# Patient Record
Sex: FEMALE | Race: BLACK OR AFRICAN AMERICAN | NOT HISPANIC OR LATINO
[De-identification: names, ages, dates, MRNs, and addresses within clinical notes are randomized per-mention and may not be internally consistent; named-entity substitution may affect disease eponyms.]

---

## 2019-09-12 PROBLEM — Z00.00 ENCOUNTER FOR PREVENTIVE HEALTH EXAMINATION: Status: ACTIVE | Noted: 2019-09-12

## 2019-11-25 ENCOUNTER — APPOINTMENT (OUTPATIENT)
Dept: FAMILY MEDICINE | Facility: CLINIC | Age: 44
End: 2019-11-25

## 2020-09-19 ENCOUNTER — TRANSCRIPTION ENCOUNTER (OUTPATIENT)
Age: 45
End: 2020-09-19

## 2021-09-20 ENCOUNTER — NON-APPOINTMENT (OUTPATIENT)
Age: 46
End: 2021-09-20

## 2021-09-20 ENCOUNTER — APPOINTMENT (OUTPATIENT)
Dept: CARDIOLOGY | Facility: CLINIC | Age: 46
End: 2021-09-20
Payer: MEDICAID

## 2021-09-20 ENCOUNTER — TRANSCRIPTION ENCOUNTER (OUTPATIENT)
Age: 46
End: 2021-09-20

## 2021-09-20 VITALS
DIASTOLIC BLOOD PRESSURE: 78 MMHG | SYSTOLIC BLOOD PRESSURE: 110 MMHG | HEART RATE: 82 BPM | HEIGHT: 67 IN | WEIGHT: 145 LBS | TEMPERATURE: 97.9 F | BODY MASS INDEX: 22.76 KG/M2 | OXYGEN SATURATION: 99 %

## 2021-09-20 DIAGNOSIS — Z86.018 PERSONAL HISTORY OF OTHER BENIGN NEOPLASM: ICD-10-CM

## 2021-09-20 DIAGNOSIS — Z83.3 FAMILY HISTORY OF DIABETES MELLITUS: ICD-10-CM

## 2021-09-20 DIAGNOSIS — Z82.3 FAMILY HISTORY OF STROKE: ICD-10-CM

## 2021-09-20 DIAGNOSIS — E78.5 HYPERLIPIDEMIA, UNSPECIFIED: ICD-10-CM

## 2021-09-20 DIAGNOSIS — Z56.0 UNEMPLOYMENT, UNSPECIFIED: ICD-10-CM

## 2021-09-20 DIAGNOSIS — E11.9 TYPE 2 DIABETES MELLITUS W/OUT COMPLICATIONS: ICD-10-CM

## 2021-09-20 DIAGNOSIS — Z71.89 OTHER SPECIFIED COUNSELING: ICD-10-CM

## 2021-09-20 PROCEDURE — 99204 OFFICE O/P NEW MOD 45 MIN: CPT

## 2021-09-20 PROCEDURE — 99072 ADDL SUPL MATRL&STAF TM PHE: CPT

## 2021-09-20 PROCEDURE — 93000 ELECTROCARDIOGRAM COMPLETE: CPT

## 2021-09-20 SDOH — ECONOMIC STABILITY - INCOME SECURITY: UNEMPLOYMENT, UNSPECIFIED: Z56.0

## 2021-09-20 NOTE — DISCUSSION/SUMMARY
[FreeTextEntry1] : 46F history of HLD and DM2 (now diet control), with episode of sudden syncope in 5/2021 with head laceration, admitted to [place], presents for cardiology evaluation. \par \par Prior episode of syncope unclear if cardiogenic vs. neurogenic etiology, EKG today within normal limits; need records from [place] for review, will obtain baseline echocardiogram,, carotid Duplex and check 2 weeks Event monitor. Referral to neurology as well and unclear mention of multiple sclerosis. \par \par \par \par 1. Syncope- await TTE, carotid Duplex and Event monitor. Need neurology eval. as well. \par \par 2. HLD- on low dose atorvastatin. \par \par 3. DM2- reportedly recent A1 <6.5% on diet control. \par \par \par \par Strongly advised COVID vaccination.

## 2021-09-20 NOTE — HISTORY OF PRESENT ILLNESS
[FreeTextEntry1] : 46F history of HLD and DM2 (now diet control), with episode of sudden syncope in 5/2021 with head laceration, admitted to [facility], presents for cardiology evaluation. \par \par She was admitted for 2-3 days at [facility], had EEG done, given diagnosis of brain stem lesion/cerebellar atrophy, possible multiple sclerosis? She recollects was walking around 8am that morning felt dizziness/lightheaded, unwitnessed event, found by pedestrian who summoned EMS, did not lose any bowel control, no prior or recent syncope. \par \par \par Mother with stroke \par Nonsmoker, no alcohol, no illicit drug use\par Not yet been vaccinated for COVID due to concern of side effect

## 2021-10-01 ENCOUNTER — APPOINTMENT (OUTPATIENT)
Dept: CARDIOLOGY | Facility: CLINIC | Age: 46
End: 2021-10-01

## 2021-12-01 ENCOUNTER — APPOINTMENT (OUTPATIENT)
Dept: NEUROLOGY | Facility: CLINIC | Age: 46
End: 2021-12-01
Payer: MEDICAID

## 2021-12-01 VITALS
DIASTOLIC BLOOD PRESSURE: 80 MMHG | WEIGHT: 143 LBS | BODY MASS INDEX: 22.44 KG/M2 | HEIGHT: 67 IN | SYSTOLIC BLOOD PRESSURE: 120 MMHG

## 2021-12-01 DIAGNOSIS — R55 SYNCOPE AND COLLAPSE: ICD-10-CM

## 2021-12-01 PROCEDURE — 99205 OFFICE O/P NEW HI 60 MIN: CPT

## 2021-12-01 PROCEDURE — 99072 ADDL SUPL MATRL&STAF TM PHE: CPT

## 2021-12-29 ENCOUNTER — APPOINTMENT (OUTPATIENT)
Dept: ANTEPARTUM | Facility: CLINIC | Age: 46
End: 2021-12-29
Payer: MEDICAID

## 2021-12-29 ENCOUNTER — ASOB RESULT (OUTPATIENT)
Age: 46
End: 2021-12-29

## 2021-12-29 PROCEDURE — 99072 ADDL SUPL MATRL&STAF TM PHE: CPT

## 2021-12-29 PROCEDURE — 76856 US EXAM PELVIC COMPLETE: CPT | Mod: 59

## 2021-12-29 PROCEDURE — 76830 TRANSVAGINAL US NON-OB: CPT

## 2022-03-07 ENCOUNTER — NON-APPOINTMENT (OUTPATIENT)
Age: 47
End: 2022-03-07

## 2022-03-15 ENCOUNTER — APPOINTMENT (OUTPATIENT)
Dept: NEUROSURGERY | Facility: CLINIC | Age: 47
End: 2022-03-15
Payer: MEDICAID

## 2022-03-15 VITALS
SYSTOLIC BLOOD PRESSURE: 159 MMHG | HEART RATE: 96 BPM | DIASTOLIC BLOOD PRESSURE: 74 MMHG | HEIGHT: 63 IN | BODY MASS INDEX: 25.34 KG/M2 | TEMPERATURE: 98.1 F | OXYGEN SATURATION: 98 % | WEIGHT: 143 LBS

## 2022-03-15 PROCEDURE — 99072 ADDL SUPL MATRL&STAF TM PHE: CPT

## 2022-03-15 PROCEDURE — 99243 OFF/OP CNSLTJ NEW/EST LOW 30: CPT

## 2022-03-15 NOTE — HISTORY OF PRESENT ILLNESS
[> 3 months] : more  than 3 months [FreeTextEntry1] : MRI results [de-identified] : JUAN VAZQUEZ is a 46 year old female with PMH HLD, and diet controlled diabetes, who presents for neurosurgical evaluation of MRI results that show an infiltrating type lesion involving the posterior fossa structures, including the medulla,ira, middle cerebellar peduncle on the right. She was admitted to the [facility] following a syncopal episode May of 2021.  Imaging then showed that she had a nonenhancing brain stem lesion. She has had no followup since discharge from the hospital. She states that she has been having some dizziness, imbalance, frontal ha with pressure, double vision at times that she describes as 3D vision. She is also having trouble swallowing liquids, solid food, as well as her saliva. She states she feels like she chokes and has to cough to clear her throat. All of these issues have been progressively getting worse over the years. She denies any ulcers in her mouth or mouth pain. She states she had an MRI cervical, thoracic, and lumbar done prior. She also states that she has left hip pain that radiates into left knee. She has been referred to pain management. \par

## 2022-03-15 NOTE — ASSESSMENT
[FreeTextEntry1] : Patient with above history and scanned imaging. Concerning symptoms that relate to lesion on brain stem. She needs further extensive workup. Ordered a MRI spectroscopy- to assess if this lesion is a tumor.\par Recommend: Vasculitis workup, Demyelinating workup, Visual exam, Lumbar puncture with cytology. Rheumatology evaluation and a MRI cervical, thoracic, lumbar if it has not been done. \par Agree with the recommendation of PT and pain management for LBP. \par \par Plan;\par MR spectroscopy to r/o tumor\par f/u after imaging\par f/u with Neurology for further workup. \par r/o vasculitis, demyelinating process, or tumor. \par \par \par \par \par \par \par  \par

## 2022-03-23 ENCOUNTER — RESULT REVIEW (OUTPATIENT)
Age: 47
End: 2022-03-23

## 2022-03-23 ENCOUNTER — APPOINTMENT (OUTPATIENT)
Dept: MRI IMAGING | Facility: CLINIC | Age: 47
End: 2022-03-23
Payer: MEDICAID

## 2022-03-23 PROCEDURE — A9585: CPT

## 2022-03-23 PROCEDURE — 70553 MRI BRAIN STEM W/O & W/DYE: CPT

## 2022-03-23 PROCEDURE — 76390 MR SPECTROSCOPY: CPT

## 2022-03-30 ENCOUNTER — APPOINTMENT (OUTPATIENT)
Dept: NEUROSURGERY | Facility: CLINIC | Age: 47
End: 2022-03-30

## 2022-04-04 ENCOUNTER — APPOINTMENT (OUTPATIENT)
Dept: MRI IMAGING | Facility: CLINIC | Age: 47
End: 2022-04-04

## 2022-04-11 ENCOUNTER — APPOINTMENT (OUTPATIENT)
Dept: MRI IMAGING | Facility: CLINIC | Age: 47
End: 2022-04-11

## 2022-04-26 ENCOUNTER — NON-APPOINTMENT (OUTPATIENT)
Age: 47
End: 2022-04-26

## 2022-04-26 ENCOUNTER — APPOINTMENT (OUTPATIENT)
Dept: NEUROSURGERY | Facility: CLINIC | Age: 47
End: 2022-04-26
Payer: MEDICAID

## 2022-04-26 VITALS
OXYGEN SATURATION: 98 % | SYSTOLIC BLOOD PRESSURE: 159 MMHG | HEART RATE: 91 BPM | WEIGHT: 143 LBS | HEIGHT: 63 IN | DIASTOLIC BLOOD PRESSURE: 90 MMHG | TEMPERATURE: 98 F | BODY MASS INDEX: 25.34 KG/M2

## 2022-04-26 PROCEDURE — 99212 OFFICE O/P EST SF 10 MIN: CPT

## 2022-05-06 ENCOUNTER — OUTPATIENT (OUTPATIENT)
Dept: OUTPATIENT SERVICES | Facility: HOSPITAL | Age: 47
LOS: 1 days | Discharge: HOME | End: 2022-05-06
Payer: COMMERCIAL

## 2022-05-06 ENCOUNTER — NON-APPOINTMENT (OUTPATIENT)
Age: 47
End: 2022-05-06

## 2022-05-06 ENCOUNTER — APPOINTMENT (OUTPATIENT)
Dept: RADIATION ONCOLOGY | Facility: CLINIC | Age: 47
End: 2022-05-06
Payer: MEDICAID

## 2022-05-06 VITALS
DIASTOLIC BLOOD PRESSURE: 91 MMHG | HEART RATE: 93 BPM | WEIGHT: 144 LBS | SYSTOLIC BLOOD PRESSURE: 141 MMHG | BODY MASS INDEX: 25.51 KG/M2 | OXYGEN SATURATION: 98 % | RESPIRATION RATE: 16 BRPM

## 2022-05-06 PROCEDURE — 99205 OFFICE O/P NEW HI 60 MIN: CPT | Mod: 25

## 2022-05-06 RX ORDER — ATORVASTATIN CALCIUM 10 MG/1
10 TABLET, FILM COATED ORAL
Refills: 0 | Status: COMPLETED | COMMUNITY
Start: 2021-09-20 | End: 2022-05-06

## 2022-05-06 NOTE — VITALS
[Maximal Pain Intensity: 9/10] : 9/10 [Least Pain Intensity: 7/10] : 7/10 [Pain Description/Quality: ___] : Pain description/quality: [unfilled] [Pain Duration: ___] : Pain duration: [unfilled] [Pain Location: ___] : Pain Location: [unfilled] [Pain Interferes with ADLs] : Pain interferes with activities of daily living. [NoTreatment Scheduled] : no treatment scheduled [70: Cares for self; unalbe to carry on normal activity or do active work.] : 70: Cares for self; unable to carry on normal activity or do active work. [ECOG Performance Status: 1 - Restricted in physically strenuous activity but ambulatory and able to carry out work of a light or sedentary nature] : Performance Status: 1 - Restricted in physically strenuous activity but ambulatory and able to carry out work of a light or sedentary nature, e.g., light house work, office work [Date: ____________] : Patient's last distress assessment performed on [unfilled]. [7 - Distress Level] : Distress Level: 7 [Referred Patient  to social work for follow-up] : Patient was referred to social work for follow-up

## 2022-05-09 ENCOUNTER — NON-APPOINTMENT (OUTPATIENT)
Age: 47
End: 2022-05-09

## 2022-05-09 NOTE — DISEASE MANAGEMENT
[Clinical] : TNM Stage: c [N/A] : Currently not applicable [FreeTextEntry4] : brainstem neoplasm [TTNM] : - [NTNM] : - [MTNM] : -

## 2022-05-09 NOTE — HISTORY OF PRESENT ILLNESS
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  Reportedly, she underwent imaging at [facility] at that time.\par \par More recently, she had a syncopal episode in 5/2021, and was admitted to [facility].\par \par 5/21/21 MRI brain showed a brainstem lesion involving the ira and medulla, hyperintense on T2 and flair, with no enhancement.  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  \par Other studies from [facility] include:\par 5/21/21 MR arteriogram: no intracranial vascular occlusion, aneurysm, or vascular malformation\par 5/21/21 MRA neck: no stenosis or evidence for dissection.\par 5/21/21 MR Venogram head: no venous sinus thrombosis.\par 5/22/21 MRI of the cervical spine: negative except for the noted brainstem abnormality \par 5/22/21 MRI T-spine: normal\par 5/22/21 MRI L-spine: normal; no disk protrusion or spinal stenosis.\par \par She met with Dr. Keegan Watson on 12/1/21 for back pain, reporting 10+ year history of low back pain radiating down the left leg, worsening recently.\par \par 3/4/22 MRI brain ([facility]): infiltrating type lesion involving the posterior fossa structures including the medulla, ira, middle cerebellar peduncle on the right and cerebral peduncles.  This was felt to be nonspecific, with DDx including infiltrating glioma, anti-GQ1b antibody syndrome, Bechets vasculitis, and rhombencephalitis.\par \par 3/7/22 met with Dr. Carreon ENT for dysphagia: vocal cord paralysis; CT neck recommended.\par \par She met with Dr. Nielson 3/15/22.\par \par 3/23/22 MR Spectroscopy:  Infiltrating mass in the right brainstem including the ira, midbrain, medulla and brachium pontis with elevated choline and decreased KATHRINE suggesting a malignant neoplasm.\par \par Currently reports headaches, nausea x 1 episode, intermittent diplopia, blurry vision, ataxia, dizziness, and persistent left hip pain radiating down the left leg.  Reports symptoms of dysphagia.  Also notes that she has difficulty with pill-form medications.\par

## 2022-05-09 NOTE — OB/GYN HISTORY
[Definite:  ___ (Date)] : the last menstrual period was [unfilled] [___] : Living: [unfilled] [History of Birth Control Pills] : Patient has no history of taking birth control pills [Currently In Menopause] : not currently in menopause

## 2022-05-09 NOTE — REVIEW OF SYSTEMS
[Visual Disturbances] : visual disturbances [Dysphagia] : dysphagia [Negative] : Allergic/Immunologic [Nausea: Grade 1 - Loss of appetite without alteration in eating habits] : Nausea: Grade 1 - Loss of appetite without alteration in eating habits [Vomiting: Grade 0] : Vomiting: Grade 0 [Edema Limbs: Grade 0] : Edema Limbs: Grade 0  [Fatigue: Grade 0] : Fatigue: Grade 0 [Localized Edema: Grade 0] : Localized Edema: Grade 0  [Neck Edema: Grade 0] : Neck Edema: Grade 0 [Urinary Incontinence: Grade 0] : Urinary Incontinence: Grade 0  [Blurred Vision: Grade 1 - Intervention not indicated] : Blurred Vision: Grade 1 - Intervention not indicated [Dizziness: Grade 1 - Mild unsteadiness or sensation of movement] : Dizziness: Grade 1 - Mild unsteadiness or sensation of movement [Headache: Grade 1 - Mild pain] : Headache: Grade 1 - Mild pain [Voice Alteration: Grade 1 - Mild or intermittent change from normal voice] : Voice Alteration: Grade 1 - Mild or intermittent change from normal voice [FreeTextEntry9] : vocal cord paralysis [Confused] : no confusion [Dizziness] : dizziness [Fainting] : fainting [Difficulty Walking] : difficulty walking

## 2022-05-09 NOTE — PHYSICAL EXAM
[Normal] : oriented to person, place and time, the affect was normal, the mood was normal and not anxious [Sclera] : the sclera and conjunctiva were normal [Extraocular Movements] : extraocular movements were intact [Outer Ear] : the ears and nose were normal in appearance [Normal Oral Cavity] : oral cavity was normal [Bowel Sounds] : normal bowel sounds [Abdomen Soft] : soft [Nondistended] : nondistended [Abdomen Tenderness] : non-tender [de-identified] : EOMI, ataxic gait, UE/LE motor strength 5/5

## 2022-05-09 NOTE — LETTER CLOSING
[Consult Closing:] : Thank you for allowing me to participate in the care of this patient.  If you have any questions, please do not hesitate to contact me. [Sincerely yours,] : Sincerely yours, [FreeTextEntry3] : Juanjo Matamoros MD\par  of Radiation Medicine, Quality and Safety\par  of Radiation Medicine\par [employer] [facility] [employer] [facility]\par [facility]\par [facility]\par

## 2022-05-10 ENCOUNTER — NON-APPOINTMENT (OUTPATIENT)
Age: 47
End: 2022-05-10

## 2022-05-12 ENCOUNTER — APPOINTMENT (OUTPATIENT)
Dept: NEUROLOGY | Facility: CLINIC | Age: 47
End: 2022-05-12
Payer: MEDICAID

## 2022-05-12 ENCOUNTER — NON-APPOINTMENT (OUTPATIENT)
Age: 47
End: 2022-05-12

## 2022-05-12 DIAGNOSIS — J38.00 PARALYSIS OF VOCAL CORDS AND LARYNX, UNSPECIFIED: ICD-10-CM

## 2022-05-12 DIAGNOSIS — G93.9 DISORDER OF BRAIN, UNSPECIFIED: ICD-10-CM

## 2022-05-12 PROCEDURE — 99215 OFFICE O/P EST HI 40 MIN: CPT

## 2022-05-12 RX ORDER — PANTOPRAZOLE 20 MG/1
20 TABLET, DELAYED RELEASE ORAL
Qty: 30 | Refills: 1 | Status: ACTIVE | COMMUNITY
Start: 2022-05-12 | End: 1900-01-01

## 2022-05-12 NOTE — REASON FOR VISIT
[Consultation] : a consultation visit [Initial Eval - Existing Diagnosis] : an initial evaluation of an existing diagnosis [Spouse] : spouse

## 2022-05-12 NOTE — PHYSICAL EXAM
[FreeTextEntry1] : \par 5 ft, 154 lbs (different than what is reported in past vitals - 5'7??)\par \par Patient is awake and alert - voice is raspy but fluent with normal reception.\par Pupils reactive to light bilaterally and symmetrically.\par Nystagmus on left lateral and right lateral gaze\par Face symmetric and facial sensation intact.\par No drift\par Strength is full throughout\par Danni ok\par Reflexes 1+ bilateral biceps, triceps, and brachioradialis, absent patella.\par Ambulates with a moderate base - mildly unsteady.

## 2022-05-12 NOTE — DISCUSSION/SUMMARY
[FreeTextEntry1] : \par In summary, this is a 48 yo woman with a brainstem infiltrative process and increasing symptomatology.\par MRI total spine with and without contrast, in light of LBP and urinary symptoms. IMay consider LP.\par While pediatric disease is often treated empirically with RT only, adults often benefit with the addition of Temozolomide. Biopsy would check fpr H3K27 mutation which would give indication whether [id number] would be helpful.\par Decadron 4 mg daily and Pantoprazole.\par Patient to call me on Monday to discuss how she is feeling on the medication.\par

## 2022-05-12 NOTE — HISTORY OF PRESENT ILLNESS
[FreeTextEntry1] : Mrs. Tovar is referred for evaluation of abnormal MRI showing brainstem abnormality.\par \par Patient is a 46 yo right handed woman who had been admitted to [facility] in May, 2021 after a syncopal episode. She recalls that she was walking and felt a pulling sensation in her head. She fell - unclear to her if she lost consciousness. However, while she was there she had multiple imaging studies\par \par 5/21/2021 MRI Brain increased flair signal in the medulla and ira without enhancement. \par MRV unremarkable.\par MRI total spine 5/22/2021 with contrast unrevealing.\par \par For unclear reason, she did not seek medical attention regarding brain MRI until 21/1 when he saw Dr. Watson, a neurologist. Her main complaint was persistent long standing left lower extremity discomfort - present for 10 years and not-responsive to epidural injections or PT.\par \par She admits to the sensation of head pulling getting worse although without diurnal or positional variation, She has nausea or vomiting.\par She has occasional double vision. She reports that she will cough with both liquids and solids and has noted a change in the quality of her voice. She denies and y bowel changes but admits that she occasionally feels that she has not emptied her bladder.

## 2022-05-12 NOTE — HISTORY OF PRESENT ILLNESS
[FreeTextEntry1] : Mrs. Tovar is referred for evaluation of abnormal MRI showing brainstem abnormality.\par \par Patient is a 46 yo right handed woman who had been admitted to [facility] in May, 2021 after a syncopal episode. She recalls that she was walking and felt a pulling sensation in her head. She fell - unclear to her if she lost consciousness. However, while she was there she had multiple imaging studies\par \par 5/21/2021 MRI Brain increased flair signal in the medulla and ira without enhancement. \par MRV unremarkable.\par MRI total spine 5/22/2021 with contrast unrevealing.\par \par For unclear reason, she did not seek medical attention regarding brain MRI until 21/1 when he saw Dr. Watson, a neurologist. Her main complaint was persistent long standing left lower extremity discomfort - present for 10 years and not-responsive to epidural injections or PT.\par \par She admits to the sensation of head pulling getting worse although without diurnal or positional variation, She has nausea or vomiting.\par She has occasional double vision. She reports that she will cough with both liquids and solids and has noted a change in the quality of her voice. She denies and y bowel changes but admits that she occasionally feels that she has not emptied her bladder.

## 2022-05-12 NOTE — DISCUSSION/SUMMARY
[FreeTextEntry1] : \par In summary, this is a 46 yo woman with a brainstem infiltrative process and increasing symptomatology.\par MRI total spine with and without contrast, in light of LBP and urinary symptoms. IMay consider LP.\par While pediatric disease is often treated empirically with RT only, adults often benefit with the addition of Temozolomide. Biopsy would check fpr H3K27 mutation which would give indication whether [id number] would be helpful.\par Decadron 4 mg daily and Pantoprazole.\par Patient to call me on Monday to discuss how she is feeling on the medication.\par

## 2022-05-19 ENCOUNTER — NON-APPOINTMENT (OUTPATIENT)
Age: 47
End: 2022-05-19

## 2022-05-20 ENCOUNTER — APPOINTMENT (OUTPATIENT)
Dept: RADIATION ONCOLOGY | Facility: CLINIC | Age: 47
End: 2022-05-20
Payer: MEDICAID

## 2022-05-20 ENCOUNTER — NON-APPOINTMENT (OUTPATIENT)
Age: 47
End: 2022-05-20

## 2022-05-20 PROCEDURE — 99442: CPT

## 2022-05-20 NOTE — HISTORY OF PRESENT ILLNESS
[Home] : at home, [unfilled] , at the time of the visit. [Medical Office: ([facility])___] : at the medical office located in  [Verbal consent obtained from patient] : the patient, [unfilled] [FreeTextEntry1] : 47-year-old woman for follow up visit regarding radiation therapy for brainstem neoplasm.\par \par Interval history:\par She met with Dr. Toscano on 5/12/22.  Recommended to start on dexamethasone 4 mg daily with pantoprazole.\par \par Reports that after reading potential side effects of medication, she and her  are afraid to start dexamethasone and she has not taken it.\par \par Reports relatively stable neurologic symptoms since last visit, with unsteady gait and dizziness with certain head movements.

## 2022-05-20 NOTE — REVIEW OF SYSTEMS
[Joint Pain] : joint pain [Gait Disturbance] : gait disturbance [Dizziness] : dizziness [Difficulty Walking] : difficulty walking [Negative] : Allergic/Immunologic [Nausea: Grade 1 - Loss of appetite without alteration in eating habits] : Nausea: Grade 1 - Loss of appetite without alteration in eating habits [Dizziness: Grade 2 - Moderate unsteadiness or sensation of movement; limiting instrumental ADL] : Dizziness: Grade 2 - Moderate unsteadiness or sensation of movement; limiting instrumental ADL [Headache: Grade 2 - Moderate pain; limiting instrumental ADL] : Headache: Grade 2 - Moderate pain; limiting instrumental ADL [FreeTextEntry9] : left hip

## 2022-05-24 ENCOUNTER — NON-APPOINTMENT (OUTPATIENT)
Age: 47
End: 2022-05-24

## 2022-05-24 PROCEDURE — 77263 THER RADIOLOGY TX PLNG CPLX: CPT

## 2022-05-25 ENCOUNTER — APPOINTMENT (OUTPATIENT)
Dept: RADIATION ONCOLOGY | Facility: CLINIC | Age: 47
End: 2022-05-25
Payer: MEDICAID

## 2022-05-25 VITALS
OXYGEN SATURATION: 99 % | WEIGHT: 156 LBS | HEART RATE: 92 BPM | SYSTOLIC BLOOD PRESSURE: 136 MMHG | BODY MASS INDEX: 27.63 KG/M2 | DIASTOLIC BLOOD PRESSURE: 82 MMHG | RESPIRATION RATE: 16 BRPM

## 2022-05-25 PROCEDURE — 99214 OFFICE O/P EST MOD 30 MIN: CPT | Mod: 25

## 2022-05-25 NOTE — ASSESSMENT
[Work-up necessary to assess local, regional or metastatic recurrence] : Work-up necessary to assess local, regional or metastatic recurrence [FreeTextEntry1] : pending treatment

## 2022-05-25 NOTE — HISTORY OF PRESENT ILLNESS
[FreeTextEntry1] : 47-year-old woman return for follow up visit regarding radiation therapy for brainstem neoplasm.\par \par Interval history:\par She has not yet undergone spine imaging.

## 2022-05-25 NOTE — REVIEW OF SYSTEMS
[Dizziness] : dizziness [Difficulty Walking] : difficulty walking [Negative] : Allergic/Immunologic [Nausea: Grade 1 - Loss of appetite without alteration in eating habits] : Nausea: Grade 1 - Loss of appetite without alteration in eating habits [Vomiting: Grade 0] : Vomiting: Grade 0 [Edema Limbs: Grade 0] : Edema Limbs: Grade 0  [Fatigue: Grade 1 - Fatigue relieved by rest] : Fatigue: Grade 1 - Fatigue relieved by rest [Localized Edema: Grade 0] : Localized Edema: Grade 0  [Neck Edema: Grade 0] : Neck Edema: Grade 0 [Blurred Vision: Grade 1 - Intervention not indicated] : Blurred Vision: Grade 1 - Intervention not indicated [Headache: Grade 2 - Moderate pain; limiting instrumental ADL] : Headache: Grade 2 - Moderate pain; limiting instrumental ADL

## 2022-05-25 NOTE — VITALS
[Maximal Pain Intensity: 8/10] : 8/10 [Least Pain Intensity: 8/10] : 8/10 [Pain Description/Quality: ___] : Pain description/quality: [unfilled] [Pain Duration: ___] : Pain duration: [unfilled] [Pain Location: ___] : Pain Location: [unfilled] [Pain Interferes with ADLs] : Pain interferes with activities of daily living. [NoTreatment Scheduled] : no treatment scheduled [70: Cares for self; unalbe to carry on normal activity or do active work.] : 70: Cares for self; unable to carry on normal activity or do active work.

## 2022-05-26 ENCOUNTER — NON-APPOINTMENT (OUTPATIENT)
Age: 47
End: 2022-05-26

## 2022-05-31 ENCOUNTER — NON-APPOINTMENT (OUTPATIENT)
Age: 47
End: 2022-05-31

## 2022-06-06 PROCEDURE — 77301 RADIOTHERAPY DOSE PLAN IMRT: CPT | Mod: 26

## 2022-06-06 PROCEDURE — 77338 DESIGN MLC DEVICE FOR IMRT: CPT | Mod: 26

## 2022-06-06 PROCEDURE — 77300 RADIATION THERAPY DOSE PLAN: CPT | Mod: 26

## 2022-06-07 ENCOUNTER — NON-APPOINTMENT (OUTPATIENT)
Age: 47
End: 2022-06-07

## 2022-06-09 ENCOUNTER — NON-APPOINTMENT (OUTPATIENT)
Age: 47
End: 2022-06-09

## 2022-06-10 PROCEDURE — 77387B: CUSTOM | Mod: 26

## 2022-06-13 PROCEDURE — 77387B: CUSTOM | Mod: 26

## 2022-06-14 ENCOUNTER — NON-APPOINTMENT (OUTPATIENT)
Age: 47
End: 2022-06-14

## 2022-06-14 PROCEDURE — 77387B: CUSTOM

## 2022-06-15 ENCOUNTER — NON-APPOINTMENT (OUTPATIENT)
Age: 47
End: 2022-06-15

## 2022-06-15 VITALS
HEIGHT: 63 IN | DIASTOLIC BLOOD PRESSURE: 91 MMHG | TEMPERATURE: 97 F | OXYGEN SATURATION: 98 % | RESPIRATION RATE: 15 BRPM | BODY MASS INDEX: 27.46 KG/M2 | HEART RATE: 95 BPM | SYSTOLIC BLOOD PRESSURE: 157 MMHG | WEIGHT: 155 LBS

## 2022-06-15 PROCEDURE — 77387B: CUSTOM

## 2022-06-15 NOTE — VITALS
[Maximal Pain Intensity: 0/10] : 0/10 [Least Pain Intensity: 0/10] : 0/10 [70: Cares for self; unalbe to carry on normal activity or do active work.] : 70: Cares for self; unable to carry on normal activity or do active work. [70: Both greater restriction of and less time spent in play activity.] : 70: Both greater restriction of and less time spent in play activity. [ECOG Performance Status: 1 - Restricted in physically strenuous activity but ambulatory and able to carry out work of a light or sedentary nature] : Performance Status: 1 - Restricted in physically strenuous activity but ambulatory and able to carry out work of a light or sedentary nature, e.g., light house work, office work

## 2022-06-15 NOTE — REVIEW OF SYSTEMS
[Nausea: Grade 0] : Nausea: Grade 0 [Vomiting: Grade 0] : Vomiting: Grade 0 [Fatigue: Grade 1 - Fatigue relieved by rest] : Fatigue: Grade 1 - Fatigue relieved by rest [Headache: Grade 0] : Headache: Grade 0 [Alopecia: Grade 0] : Alopecia: Grade 0 [Skin Hyperpigmentation: Grade 0] : Skin Hyperpigmentation: Grade 0

## 2022-06-15 NOTE — DISEASE MANAGEMENT
[Clinical] : TNM Stage: c [N/A] : Currently not applicable [FreeTextEntry4] : brainstem neoplasm [TTNM] : - [NTNM] : - [MTNM] : - [de-identified] : [id number] [de-identified] : [id number]

## 2022-06-16 PROCEDURE — 77014: CPT | Mod: 26

## 2022-06-16 PROCEDURE — 77427 RADIATION TX MANAGEMENT X5: CPT

## 2022-06-17 PROCEDURE — G6002: CPT | Mod: 26

## 2022-06-17 PROCEDURE — 77387B: CUSTOM | Mod: 26

## 2022-06-20 PROCEDURE — 77387B: CUSTOM | Mod: 26

## 2022-06-21 PROCEDURE — 77387B: CUSTOM | Mod: 26

## 2022-06-22 PROCEDURE — 77387B: CUSTOM | Mod: 26

## 2022-06-23 ENCOUNTER — APPOINTMENT (OUTPATIENT)
Dept: NEUROLOGY | Facility: CLINIC | Age: 47
End: 2022-06-23
Payer: MEDICAID

## 2022-06-23 ENCOUNTER — NON-APPOINTMENT (OUTPATIENT)
Age: 47
End: 2022-06-23

## 2022-06-23 ENCOUNTER — APPOINTMENT (OUTPATIENT)
Dept: MRI IMAGING | Facility: IMAGING CENTER | Age: 47
End: 2022-06-23

## 2022-06-23 ENCOUNTER — OUTPATIENT (OUTPATIENT)
Dept: OUTPATIENT SERVICES | Facility: HOSPITAL | Age: 47
LOS: 1 days | End: 2022-06-23
Payer: COMMERCIAL

## 2022-06-23 ENCOUNTER — APPOINTMENT (OUTPATIENT)
Dept: MRI IMAGING | Facility: IMAGING CENTER | Age: 47
End: 2022-06-23
Payer: MEDICAID

## 2022-06-23 VITALS
HEART RATE: 76 BPM | BODY MASS INDEX: 27.82 KG/M2 | TEMPERATURE: 97 F | RESPIRATION RATE: 15 BRPM | WEIGHT: 157 LBS | OXYGEN SATURATION: 99 % | HEIGHT: 63 IN | SYSTOLIC BLOOD PRESSURE: 138 MMHG | DIASTOLIC BLOOD PRESSURE: 80 MMHG

## 2022-06-23 DIAGNOSIS — C71.7 MALIGNANT NEOPLASM OF BRAIN STEM: ICD-10-CM

## 2022-06-23 PROCEDURE — 72157 MRI CHEST SPINE W/O & W/DYE: CPT | Mod: 26

## 2022-06-23 PROCEDURE — 72156 MRI NECK SPINE W/O & W/DYE: CPT

## 2022-06-23 PROCEDURE — 99215 OFFICE O/P EST HI 40 MIN: CPT

## 2022-06-23 PROCEDURE — 72158 MRI LUMBAR SPINE W/O & W/DYE: CPT | Mod: 26

## 2022-06-23 PROCEDURE — 77427 RADIATION TX MANAGEMENT X5: CPT

## 2022-06-23 PROCEDURE — 72158 MRI LUMBAR SPINE W/O & W/DYE: CPT

## 2022-06-23 PROCEDURE — 72156 MRI NECK SPINE W/O & W/DYE: CPT | Mod: 26

## 2022-06-23 PROCEDURE — 77014: CPT | Mod: 26

## 2022-06-23 PROCEDURE — 72157 MRI CHEST SPINE W/O & W/DYE: CPT

## 2022-06-23 RX ORDER — ERGOCALCIFEROL 1.25 MG/1
1.25 MG CAPSULE, LIQUID FILLED ORAL
Qty: 4 | Refills: 0 | Status: ACTIVE | COMMUNITY
Start: 2022-03-07

## 2022-06-23 NOTE — PHYSICAL EXAM
[Extraocular Movements] : extraocular movements were intact [Outer Ear] : the ears and nose were normal in appearance [Normal] : oriented to person, place and time, the affect was normal, the mood was normal and not anxious [de-identified] : ataxia, unchanged.

## 2022-06-23 NOTE — DISEASE MANAGEMENT
[Clinical] : TNM Stage: c [N/A] : Currently not applicable [FreeTextEntry4] : brainstem neoplasm [TTNM] : - [NTNM] : - [MTNM] : - [de-identified] : [id number] [de-identified] : [id number]

## 2022-06-23 NOTE — PHYSICAL EXAM
Discharge Instructions - ADHD Follow Up    - You will need to return to the office as instructed for follow up, or sooner if you develop symptoms.  - Medication should be taken first thing in the morning.  - It is your responsibility to safe guard your medication. Any medication that is lost or stolen may not be renewed until your next scheduled appointment.  - If you develop any symptoms such as headache, weight loss, loss of appetite, chest pain, palpitations, or change in behavior, please contact our office immediately or go to your local emergency rooms for any emergent needs.  - Your next appointment will be a walk in visit. Dr Goznalez is here Monday-Thursdays, and you will need to be signed in by 3 pm in order to guarantee your prescription is filled that day. You may be seen on Fridays or Saturdays for medication follow up as well, but your prescription will not be ready for  until the following Monday.    [General Appearance - Alert] : alert [General Appearance - In No Acute Distress] : in no acute distress [Oriented To Time, Place, And Person] : oriented to person, place, and time [Impaired Insight] : insight and judgment were intact [Affect] : the affect was normal [Person] : oriented to person [Place] : oriented to place [Time] : oriented to time [Cranial Nerves Optic (II)] : visual acuity intact bilaterally,  pupils equal round and reactive to light [Cranial Nerves Oculomotor (III)] : extraocular motion intact [Cranial Nerves Trigeminal (V)] : facial sensation intact symmetrically [Cranial Nerves Facial (VII)] : face symmetrical [Cranial Nerves Vestibulocochlear (VIII)] : hearing was intact bilaterally [Cranial Nerves Glossopharyngeal (IX)] : tongue and palate midline [Cranial Nerves Accessory (XI - Cranial And Spinal)] : head turning and shoulder shrug symmetric [Cranial Nerves Hypoglossal (XII)] : there was no tongue deviation with protrusion [Motor Strength] : muscle strength was normal in all four extremities [Sensation Tactile Decrease] : light touch was intact [Sensation Pain / Temperature Decrease] : pain and temperature was intact [Balance] : balance was intact [Sclera] : the sclera and conjunctiva were normal [PERRL With Normal Accommodation] : pupils were equal in size, round, reactive to light, with normal accommodation [Extraocular Movements] : extraocular movements were intact [Neck Appearance] : the appearance of the neck was normal [] : no respiratory distress [Abnormal Walk] : normal gait [Involuntary Movements] : no involuntary movements were seen [Motor Tone] : muscle strength and tone were normal

## 2022-06-23 NOTE — REVIEW OF SYSTEMS
[Nausea: Grade 0] : Nausea: Grade 0 [Vomiting: Grade 0] : Vomiting: Grade 0 [Fatigue: Grade 1 - Fatigue relieved by rest] : Fatigue: Grade 1 - Fatigue relieved by rest [Alopecia: Grade 0] : Alopecia: Grade 0 [Skin Hyperpigmentation: Grade 0] : Skin Hyperpigmentation: Grade 0 [Headache: Grade 1 - Mild pain] : Headache: Grade 1 - Mild pain

## 2022-06-23 NOTE — HISTORY OF PRESENT ILLNESS
[FreeTextEntry1] : Mrs. Katy Tovar was seen in neuro oncologic follow up of her brainstem tumor.\par \par She is a 48 yo woman who presented to me initially on 5/12 for brainstem abnormality. She was symptomatic with sensation of "head pulling," double vision, occasional coughing during meals, and a change in the quality of her voice. An MRI of the brain on 3/23/2022 had shown:\par \par IMPRESSION: Infiltrating mass in the right brainstem including the ira, midbrain, medulla and brachium pontis with elevated choline and decreased KATHRINE suggesting a malignant neoplasm. There was no noted enhancement.\par \par At the time of consultation, her exam was notable for bilateral nystagmus and an unsteady gait.\par \par We had discussed biopsy which she did not want to do. We had also discussed addition of Temozolomide.\par However, she started with RT alone and is about prison through treatment. \par She notes that her vision has improved - no longer has double vision. She also feels that she is choking less when eating or drinking. Her gait remains unstable. She also continues to have low back pain into her left buttock and travelling around to her knee. This pain had been present for about 10 years. A prior MRI os spine with contrast one year ago was unremarkable - this was repeated today - and to my review with Dr. Christie of neuroradiology - no enhancement or explanation for her symptoms.\par \par She reports control of her bladder and bowel.\par She did start Decadron 4 mg daily and Pantoprazole.

## 2022-06-23 NOTE — VITALS
[Least Pain Intensity: 0/10] : 0/10 [70: Cares for self; unalbe to carry on normal activity or do active work.] : 70: Cares for self; unable to carry on normal activity or do active work. [ECOG Performance Status: 1 - Restricted in physically strenuous activity but ambulatory and able to carry out work of a light or sedentary nature] : Performance Status: 1 - Restricted in physically strenuous activity but ambulatory and able to carry out work of a light or sedentary nature, e.g., light house work, office work [Maximal Pain Intensity: 2/10] : 2/10 [Pain Description/Quality: ___] : Pain description/quality: [unfilled] [Pain Duration: ___] : Pain duration: [unfilled] [Pain Location: ___] : Pain Location: [unfilled] [Adjuvant (neuroleptics)] : adjuvant (neuroleptics) [Pain Interferes with ADLs] : Pain does not interfere with activities of daily living

## 2022-06-23 NOTE — DISCUSSION/SUMMARY
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

## 2022-06-24 PROCEDURE — G6002: CPT | Mod: 26

## 2022-06-24 PROCEDURE — 77387B: CUSTOM | Mod: 26

## 2022-06-27 PROCEDURE — 77387B: CUSTOM | Mod: 26

## 2022-06-28 ENCOUNTER — APPOINTMENT (OUTPATIENT)
Dept: NEUROLOGY | Facility: CLINIC | Age: 47
End: 2022-06-28
Payer: MEDICAID

## 2022-06-28 VITALS
HEIGHT: 63 IN | DIASTOLIC BLOOD PRESSURE: 80 MMHG | SYSTOLIC BLOOD PRESSURE: 120 MMHG | BODY MASS INDEX: 27.11 KG/M2 | WEIGHT: 153 LBS

## 2022-06-28 PROCEDURE — 99214 OFFICE O/P EST MOD 30 MIN: CPT

## 2022-06-28 PROCEDURE — 77387B: CUSTOM | Mod: 26

## 2022-06-28 NOTE — HISTORY OF PRESENT ILLNESS
[FreeTextEntry1] : I had seen her initially on 12/1/2021 with the following history.  This 46-year-old woman was admitted to the [facility] following a syncopal episode May of this year. She had extensive imaging studies done and these are discussed further below. The salient feature was that she had a nonenhancing brain stem lesion , most likely felt to be a low-grade glioma. She has had no followup since discharge from the hospital. Denies significant headaches. Denies any nausea vomiting. No double vision or other visual changes reported.\par \par She says she is actually here today because of back pain. For at least the last 10 years his low back pain radiating down the left leg. It is getting worse recently. Recent lumbar MRI was totally negative. She's had physical therapy and injections in the past which did not help.\par \par Medical history otherwise different for hyperlipidemia and diet-controlled diabetes\par \par Nonsmoker\par \par I referred her follow-up brain MRI which was unchanged.  I referred her to neurosurgery who subsequently referred her to neurooncology.  Notes have been reviewed.  She is currently undergoing radiation therapy for presumptive brainstem glioma.  No biopsy done.  Also on steroids.  She gets occasional double vision.  Her back and leg pain persist.  I suggested pain management and physical therapy but she said she had already done that and did not wish to pursue that further.  Recently prescribed gabapentin which she has not started as yet.\par MRI of the entire spine negative except for some extension of tumor to the upper cervical cord.

## 2022-06-28 NOTE — DATA REVIEWED
[de-identified] : Brain MRI from 5/21/21 reports a brainstem lesion involving the ira and medulla, hyperintense on T2 and flair, no enhancement, felt most likely to represent a low-grade glioma\par MRV of the head negative\par MR perfusion negative [de-identified] : Other studies from [place] include:\par CT scan of the head negative\par MRI of the cervical spine negative except for the noted brainstem abnormality \par MRI of the thoracic spine negative\par MRI of the lumbar spine negative\par MRA of the head negative\par MRA of the neck negative

## 2022-06-28 NOTE — ASSESSMENT
[FreeTextEntry1] : Likely brainstem glioma undergoing radiation therapy and follow-up with neuro-oncology\par Long history of low back pain radiating down the left leg with negative MRI\par Gabapentin recently prescribed\par She does not wish to see pain management or go for physical therapy as she has done these in the past without relief\par \par Follow-up here as needed.\par .

## 2022-06-28 NOTE — PHYSICAL EXAM
[FreeTextEntry1] : There is no lumbar palpation tenderness.\par There is full range of movement of the lumbar spine.\par Straight leg raising is negative bilaterally.\par Tomasz's maneuver negative bilaterally.\par  [General Appearance - Alert] : alert [General Appearance - In No Acute Distress] : in no acute distress [General Appearance - Well-Appearing] : healthy appearing [Oriented To Time, Place, And Person] : oriented to person, place, and time [Impaired Insight] : insight and judgment were intact [Affect] : the affect was normal [Memory Recent] : recent memory was not impaired [Person] : oriented to person [Place] : oriented to place [Time] : oriented to time [Concentration Intact] : normal concentrating ability [Fluency] : fluency intact [Comprehension] : comprehension intact [Cranial Nerves Optic (II)] : visual acuity intact bilaterally,  visual fields full to confrontation, pupils equal round and reactive to light [Cranial Nerves Oculomotor (III)] : extraocular motion intact [Cranial Nerves Trigeminal (V)] : facial sensation intact symmetrically [Cranial Nerves Facial (VII)] : face symmetrical [Cranial Nerves Vestibulocochlear (VIII)] : hearing was intact bilaterally [Cranial Nerves Glossopharyngeal (IX)] : tongue and palate midline [Cranial Nerves Accessory (XI - Cranial And Spinal)] : head turning and shoulder shrug symmetric [Cranial Nerves Hypoglossal (XII)] : there was no tongue deviation with protrusion [Motor Tone] : muscle tone was normal in all four extremities [Motor Strength] : muscle strength was normal in all four extremities [No Muscle Atrophy] : normal bulk in all four extremities [Paresis Pronator Drift Right-Sided] : no pronator drift on the right [Paresis Pronator Drift Left-Sided] : no pronator drift on the left [Sensation Tactile Decrease] : light touch was intact [Sensation Pain / Temperature Decrease] : pain and temperature was intact [Proprioception] : proprioception was intact [Romberg's Sign] : Romberg's sign was negtive [Past-pointing] : there was no past-pointing [Tremor] : no tremor present [Coordination - Dysmetria Impaired Finger-to-Nose Bilateral] : not present [Coordination - Dysmetria Impaired Heel-to-Shin Bilateral] : not present [2+] : Brachioradialis left 2+ [1+] : Ankle jerk left 1+ [Plantar Reflex Right Only] : normal on the right [Plantar Reflex Left Only] : normal on the left [FreeTextEntry5] : Lateral end gaze nystagmus bilaterally. [FreeTextEntry8] : Gait mildly unsteady. [PERRL With Normal Accommodation] : pupils were equal in size, round, reactive to light, with normal accommodation [Extraocular Movements] : extraocular movements were intact [Optic Disc Abnormality] : the optic disc were normal in size and color [Full Visual Field] : full visual field

## 2022-06-29 PROCEDURE — 77387B: CUSTOM | Mod: 26

## 2022-06-30 ENCOUNTER — NON-APPOINTMENT (OUTPATIENT)
Age: 47
End: 2022-06-30

## 2022-06-30 VITALS
OXYGEN SATURATION: 97 % | RESPIRATION RATE: 16 BRPM | SYSTOLIC BLOOD PRESSURE: 163 MMHG | DIASTOLIC BLOOD PRESSURE: 80 MMHG | WEIGHT: 158 LBS | HEART RATE: 78 BPM | BODY MASS INDEX: 27.99 KG/M2

## 2022-06-30 PROCEDURE — 77014: CPT | Mod: 26

## 2022-06-30 PROCEDURE — 77427 RADIATION TX MANAGEMENT X5: CPT

## 2022-06-30 NOTE — DISEASE MANAGEMENT
[Clinical] : TNM Stage: c [N/A] : Currently not applicable [FreeTextEntry4] : brainstem neoplasm [TTNM] : - [NTNM] : - [MTNM] : - [de-identified] : 0125 [de-identified] : [id number]

## 2022-06-30 NOTE — PHYSICAL EXAM
[Extraocular Movements] : extraocular movements were intact [Outer Ear] : the ears and nose were normal in appearance [Normal] : oriented to person, place and time, the affect was normal, the mood was normal and not anxious [de-identified] : wide-based gait, increased stability of gait

## 2022-06-30 NOTE — VITALS
[Maximal Pain Intensity: 2/10] : 2/10 [Least Pain Intensity: 0/10] : 0/10 [Pain Description/Quality: ___] : Pain description/quality: [unfilled] [Pain Duration: ___] : Pain duration: [unfilled] [Pain Location: ___] : Pain Location: [unfilled] [Adjuvant (neuroleptics)] : adjuvant (neuroleptics) [70: Cares for self; unalbe to carry on normal activity or do active work.] : 70: Cares for self; unable to carry on normal activity or do active work. [ECOG Performance Status: 1 - Restricted in physically strenuous activity but ambulatory and able to carry out work of a light or sedentary nature] : Performance Status: 1 - Restricted in physically strenuous activity but ambulatory and able to carry out work of a light or sedentary nature, e.g., light house work, office work [Pain Interferes with ADLs] : Pain does not interfere with activities of daily living

## 2022-06-30 NOTE — REVIEW OF SYSTEMS
[Nausea: Grade 0] : Nausea: Grade 0 [Vomiting: Grade 0] : Vomiting: Grade 0 [Fatigue: Grade 1 - Fatigue relieved by rest] : Fatigue: Grade 1 - Fatigue relieved by rest [Headache: Grade 1 - Mild pain] : Headache: Grade 1 - Mild pain [Alopecia: Grade 0] : Alopecia: Grade 0 [Skin Hyperpigmentation: Grade 0] : Skin Hyperpigmentation: Grade 0 [Dizziness: Grade 1 - Mild unsteadiness or sensation of movement] : Dizziness: Grade 1 - Mild unsteadiness or sensation of movement

## 2022-07-01 PROCEDURE — 77387B: CUSTOM | Mod: 26

## 2022-07-06 PROCEDURE — 77387B: CUSTOM | Mod: 26

## 2022-07-07 ENCOUNTER — NON-APPOINTMENT (OUTPATIENT)
Age: 47
End: 2022-07-07

## 2022-07-07 VITALS
TEMPERATURE: 97 F | SYSTOLIC BLOOD PRESSURE: 138 MMHG | WEIGHT: 155 LBS | DIASTOLIC BLOOD PRESSURE: 84 MMHG | OXYGEN SATURATION: 97 % | BODY MASS INDEX: 27.46 KG/M2 | RESPIRATION RATE: 16 BRPM | HEART RATE: 80 BPM

## 2022-07-07 DIAGNOSIS — R35.89 OTHER POLYURIA: ICD-10-CM

## 2022-07-07 PROCEDURE — 77014: CPT | Mod: 26

## 2022-07-07 NOTE — PHYSICAL EXAM
[Extraocular Movements] : extraocular movements were intact [Outer Ear] : the ears and nose were normal in appearance [Normal] : oriented to person, place and time, the affect was normal, the mood was normal and not anxious [de-identified] : wide-based gait, stable

## 2022-07-07 NOTE — VITALS
[Least Pain Intensity: 0/10] : 0/10 [Pain Duration: ___] : Pain duration: [unfilled] [Pain Location: ___] : Pain Location: [unfilled] [Adjuvant (neuroleptics)] : adjuvant (neuroleptics) [70: Cares for self; unalbe to carry on normal activity or do active work.] : 70: Cares for self; unable to carry on normal activity or do active work. [ECOG Performance Status: 1 - Restricted in physically strenuous activity but ambulatory and able to carry out work of a light or sedentary nature] : Performance Status: 1 - Restricted in physically strenuous activity but ambulatory and able to carry out work of a light or sedentary nature, e.g., light house work, office work [Maximal Pain Intensity: 5/10] : 5/10 [Pain Description/Quality: ___] : Pain description/quality: [unfilled] [Pain Interferes with ADLs] : Pain does not interfere with activities of daily living

## 2022-07-07 NOTE — REVIEW OF SYSTEMS
[Nausea: Grade 0] : Nausea: Grade 0 [Vomiting: Grade 0] : Vomiting: Grade 0 [Fatigue: Grade 1 - Fatigue relieved by rest] : Fatigue: Grade 1 - Fatigue relieved by rest [Dizziness: Grade 1 - Mild unsteadiness or sensation of movement] : Dizziness: Grade 1 - Mild unsteadiness or sensation of movement [Headache: Grade 1 - Mild pain] : Headache: Grade 1 - Mild pain [Alopecia: Grade 0] : Alopecia: Grade 0 [Skin Hyperpigmentation: Grade 0] : Skin Hyperpigmentation: Grade 0 [Urinary Frequency: Grade 1 - Present] : Urinary Frequency: Grade 1 - Present

## 2022-07-07 NOTE — DISEASE MANAGEMENT
[Clinical] : TNM Stage: c [N/A] : Currently not applicable [FreeTextEntry4] : brainstem neoplasm [TTNM] : - [NTNM] : - [MTNM] : - [de-identified] : 5439 [de-identified] : 8488 [de-identified] : Partial Brain Right

## 2022-07-08 PROCEDURE — 77387B: CUSTOM | Mod: 26

## 2022-07-09 ENCOUNTER — LABORATORY RESULT (OUTPATIENT)
Age: 47
End: 2022-07-09

## 2022-07-11 LAB
APPEARANCE: CLEAR
BILIRUBIN URINE: NEGATIVE
BLOOD URINE: NEGATIVE
COLOR: YELLOW
GLUCOSE BS SERPL-MCNC: 78 MG/DL
GLUCOSE QUALITATIVE U: NEGATIVE
KETONES URINE: NEGATIVE
LEUKOCYTE ESTERASE URINE: NEGATIVE
NITRITE URINE: NEGATIVE
PH URINE: 7
PROTEIN URINE: ABNORMAL
SPECIFIC GRAVITY URINE: 1.03
UROBILINOGEN URINE: NORMAL

## 2022-07-11 PROCEDURE — 77387B: CUSTOM | Mod: 26

## 2022-07-11 PROCEDURE — 77427 RADIATION TX MANAGEMENT X5: CPT

## 2022-07-12 ENCOUNTER — NON-APPOINTMENT (OUTPATIENT)
Age: 47
End: 2022-07-12

## 2022-07-12 PROCEDURE — 77387B: CUSTOM | Mod: 26

## 2022-07-13 PROCEDURE — 77387B: CUSTOM | Mod: 26

## 2022-07-14 ENCOUNTER — NON-APPOINTMENT (OUTPATIENT)
Age: 47
End: 2022-07-14

## 2022-07-14 VITALS
WEIGHT: 153 LBS | HEART RATE: 85 BPM | OXYGEN SATURATION: 98 % | BODY MASS INDEX: 27.1 KG/M2 | DIASTOLIC BLOOD PRESSURE: 84 MMHG | SYSTOLIC BLOOD PRESSURE: 148 MMHG | RESPIRATION RATE: 16 BRPM

## 2022-07-14 PROCEDURE — 77014: CPT | Mod: 26

## 2022-07-14 NOTE — REVIEW OF SYSTEMS
[Nausea: Grade 0] : Nausea: Grade 0 [Vomiting: Grade 0] : Vomiting: Grade 0 [Fatigue: Grade 1 - Fatigue relieved by rest] : Fatigue: Grade 1 - Fatigue relieved by rest [Dizziness: Grade 1 - Mild unsteadiness or sensation of movement] : Dizziness: Grade 1 - Mild unsteadiness or sensation of movement [Headache: Grade 1 - Mild pain] : Headache: Grade 1 - Mild pain [Alopecia: Grade 0] : Alopecia: Grade 0 [Skin Hyperpigmentation: Grade 0] : Skin Hyperpigmentation: Grade 0 [Dermatitis Radiation: Grade 0] : Dermatitis Radiation: Grade 0

## 2022-07-14 NOTE — PHYSICAL EXAM
[Extraocular Movements] : extraocular movements were intact [Outer Ear] : the ears and nose were normal in appearance [Normal] : oriented to person, place and time, the affect was normal, the mood was normal and not anxious [de-identified] : negative romberg

## 2022-07-14 NOTE — DISEASE MANAGEMENT
[Clinical] : TNM Stage: c [N/A] : Currently not applicable [FreeTextEntry4] : brainstem neoplasm [TTNM] : - [NTNM] : - [MTNM] : - [de-identified] : 0501 [de-identified] : 3005 [de-identified] : brain

## 2022-07-15 PROCEDURE — 77387B: CUSTOM | Mod: 26

## 2022-07-18 ENCOUNTER — NON-APPOINTMENT (OUTPATIENT)
Age: 47
End: 2022-07-18

## 2022-07-18 PROCEDURE — 77387B: CUSTOM | Mod: 26

## 2022-07-18 PROCEDURE — 77427 RADIATION TX MANAGEMENT X5: CPT

## 2022-07-19 ENCOUNTER — NON-APPOINTMENT (OUTPATIENT)
Age: 47
End: 2022-07-19

## 2022-07-19 PROCEDURE — 77387B: CUSTOM | Mod: 26

## 2022-07-21 ENCOUNTER — NON-APPOINTMENT (OUTPATIENT)
Age: 47
End: 2022-07-21

## 2022-07-21 ENCOUNTER — APPOINTMENT (OUTPATIENT)
Dept: NEUROLOGY | Facility: CLINIC | Age: 47
End: 2022-07-21

## 2022-07-21 VITALS
SYSTOLIC BLOOD PRESSURE: 150 MMHG | DIASTOLIC BLOOD PRESSURE: 84 MMHG | HEART RATE: 73 BPM | BODY MASS INDEX: 27.46 KG/M2 | OXYGEN SATURATION: 99 % | RESPIRATION RATE: 16 BRPM | WEIGHT: 155 LBS

## 2022-07-21 PROCEDURE — 99215 OFFICE O/P EST HI 40 MIN: CPT

## 2022-07-21 PROCEDURE — 77014: CPT | Mod: 26

## 2022-07-21 NOTE — PHYSICAL EXAM
[FreeTextEntry1] : She is awake and alert - oriented fluent.\par Voice remains "raspy" by clear to understand.\par slight 2 beat nystagmus on right end gaze - none to left and she is able to look up today.\par Strength is full in UE and LE\par mild bilateral dysmetria\par Able to walk - moderate base - she can walk unassisted but tylically walks with her  as a support.

## 2022-07-21 NOTE — PHYSICAL EXAM
[Extraocular Movements] : extraocular movements were intact [Outer Ear] : the ears and nose were normal in appearance [Normal] : oriented to person, place and time, the affect was normal, the mood was normal and not anxious [de-identified] : 5/5 strength bilateral UE/LE; gait moderate base

## 2022-07-21 NOTE — HISTORY OF PRESENT ILLNESS
[FreeTextEntry1] : Mrs. Katy Tovar was seen in neuro oncologic follow up of her brainstem tumor.\par \par She is a 46 yo woman who presented to me initially on 5/12 for brainstem abnormality. She was symptomatic with sensation of "head pulling," double vision, occasional coughing during meals, and a change in the quality of her voice. An MRI of the brain on 3/23/2022 had shown:\par \par IMPRESSION: Infiltrating mass in the right brainstem including the ira, midbrain, medulla and brachium pontis with elevated choline and decreased KATHRINE suggesting a malignant neoplasm. There was no noted enhancement.\par \par At the time of consultation, her exam was notable for bilateral nystagmus and an unsteady gait.\par \par We had discussed biopsy which she did not want to do. We had also discussed addition of Temozolomide.\par However, she started with RT alone and is about senior living through treatment. \par She notes that her vision has improved - no longer has double vision. She also feels that she is choking less when eating or drinking. Her gait remains unstable. She also continues to have low back pain into her left buttock and travelling around to her knee. This pain had been present for about 10 years. A prior MRI os spine with contrast one year ago was unremarkable - this was repeated today - and to my review with Dr. Christie of neuroradiology - no enhancement or explanation for her symptoms.\par \par She reports control of her bladder and bowel.\par She did start Decadron 4 mg daily and Pantoprazole.\par \par She notes improved swallowing ability - no choking on solid or liquid.\par Left sciatica improves about 50% with Gabapentin 300 mg daily.\par No side effects noted.

## 2022-07-21 NOTE — DISCUSSION/SUMMARY
[FreeTextEntry1] : In summary, this is a 48 yo woman who should be completing RT to brainstem mass (no biopsy). She did not want chemotherapy. She continues to improve neurologically. Would reduce Decadron to 2 mg daily. Gabapentin has helped her left LE pain but is still present - will increase to 600 mg nightly.\par She should follow up @ 8/24 with a new MRI and visit.

## 2022-07-21 NOTE — DISEASE MANAGEMENT
[Clinical] : TNM Stage: c [N/A] : Currently not applicable [FreeTextEntry4] : brainstem neoplasm [TTNM] : - [NTNM] : - [MTNM] : - [de-identified] : 9012 [de-identified] : 7999 [de-identified] : brain

## 2022-07-22 PROCEDURE — 77387B: CUSTOM | Mod: 26

## 2022-07-25 PROCEDURE — 77387B: CUSTOM | Mod: 26

## 2022-07-26 PROCEDURE — 77387B: CUSTOM | Mod: 26

## 2022-07-26 PROCEDURE — 77427 RADIATION TX MANAGEMENT X5: CPT

## 2022-07-27 NOTE — HISTORY OF PRESENT ILLNESS
[Follow up with Radiation MD in _____] : Follow up with Radiation MD in [unfilled] [Follow up with Oncologist in _____] : Follow up with Oncologist in [unfilled] [Scans: ______] : Scans: [unfilled] [Primary care physician] : primary care physician [Physical Functioning] : Physical functioning [Fatigue] : Fatigue [FreeTextEntry8] : Tessy Anaya

## 2022-07-28 ENCOUNTER — NON-APPOINTMENT (OUTPATIENT)
Age: 47
End: 2022-07-28

## 2022-08-04 ENCOUNTER — NON-APPOINTMENT (OUTPATIENT)
Age: 47
End: 2022-08-04

## 2022-08-13 ENCOUNTER — OUTPATIENT (OUTPATIENT)
Dept: OUTPATIENT SERVICES | Facility: HOSPITAL | Age: 47
LOS: 1 days | End: 2022-08-13
Payer: COMMERCIAL

## 2022-08-13 ENCOUNTER — APPOINTMENT (OUTPATIENT)
Dept: MRI IMAGING | Facility: IMAGING CENTER | Age: 47
End: 2022-08-13

## 2022-08-13 DIAGNOSIS — Z00.8 ENCOUNTER FOR OTHER GENERAL EXAMINATION: ICD-10-CM

## 2022-08-13 DIAGNOSIS — C71.7 MALIGNANT NEOPLASM OF BRAIN STEM: ICD-10-CM

## 2022-08-13 PROCEDURE — 70553 MRI BRAIN STEM W/O & W/DYE: CPT | Mod: 26

## 2022-08-13 PROCEDURE — 70553 MRI BRAIN STEM W/O & W/DYE: CPT

## 2022-08-13 PROCEDURE — A9585: CPT

## 2022-08-16 ENCOUNTER — NON-APPOINTMENT (OUTPATIENT)
Age: 47
End: 2022-08-16

## 2022-08-16 ENCOUNTER — APPOINTMENT (OUTPATIENT)
Dept: NEUROLOGY | Facility: CLINIC | Age: 47
End: 2022-08-16

## 2022-08-16 PROCEDURE — 99215 OFFICE O/P EST HI 40 MIN: CPT

## 2022-08-16 NOTE — DISCUSSION/SUMMARY
[FreeTextEntry1] : \par I have personally reviewed her MRI scan of the brain which was performed on 8/13 and have compared it to her prior MRI scan dated 3/23/2022. I have spoken to Dr. Dayton Gupta of neuroradiology to review and he feels, as do I, that the non-enhancing abnormality seen in the right brainstem is stable. To review, this was not biopsied and she refused Temodar chemotherapy.\par \par We had discussed treatment with Temodar - however, patient does not want to discuss this. She does not want further follow up imaging at this time. I also offered to have her come in for a follow up neurologic examination in 6 weeks to make sure her symptoms are stable and she is not ready to commit. Her  was present for the discussion regarding risks of delaying or not pursuing treatment and had no questions. I remain available.

## 2022-08-16 NOTE — HISTORY OF PRESENT ILLNESS
[FreeTextEntry1] : Mrs. Katy Tovar was seen in neuro oncologic follow up of her brainstem tumor.\par \par She is a 46 yo woman who presented to me initially on 5/12 for brainstem abnormality. She was symptomatic with sensation of "head pulling," double vision, occasional coughing during meals, and a change in the quality of her voice. An MRI of the brain on 3/23/2022 had shown:\par \par IMPRESSION: Infiltrating mass in the right brainstem including the ira, midbrain, medulla and brachium pontis with elevated choline and decreased KATHRINE suggesting a malignant neoplasm. There was no noted enhancement.\par \par At the time of consultation, her exam was notable for bilateral nystagmus and an unsteady gait.\par \par We had discussed biopsy which she did not want to do. We had also discussed addition of Temozolomide.\par However, she started with RT alone and completed this on 7/26.\par \par Clinically, she has improved - she reports improved vision, gait stability and swallowing.\par She self-discontinued all medications including Decadron and Gabapentin.\par \par She denies any significant headache, weakness, neck or back pain, bladder or bowel issues.\par

## 2022-08-16 NOTE — PHYSICAL EXAM
[FreeTextEntry1] : She is awake and alert - fluent with full reception.\par Hoarse voice\par Right end gaze nystagmus\par Face symmetric\par Strength is full in UE and LE\par Slight dysmetria on the left FNF - right is better -she is walking more independently and steadily.

## 2022-08-18 ENCOUNTER — NON-APPOINTMENT (OUTPATIENT)
Age: 47
End: 2022-08-18

## 2022-08-22 ENCOUNTER — APPOINTMENT (OUTPATIENT)
Dept: RADIATION ONCOLOGY | Facility: CLINIC | Age: 47
End: 2022-08-22

## 2022-08-23 ENCOUNTER — NON-APPOINTMENT (OUTPATIENT)
Age: 47
End: 2022-08-23

## 2022-09-01 ENCOUNTER — NON-APPOINTMENT (OUTPATIENT)
Age: 47
End: 2022-09-01

## 2022-09-06 ENCOUNTER — NON-APPOINTMENT (OUTPATIENT)
Age: 47
End: 2022-09-06

## 2022-09-08 ENCOUNTER — NON-APPOINTMENT (OUTPATIENT)
Age: 47
End: 2022-09-08

## 2022-09-13 ENCOUNTER — NON-APPOINTMENT (OUTPATIENT)
Age: 47
End: 2022-09-13

## 2022-10-04 ENCOUNTER — NON-APPOINTMENT (OUTPATIENT)
Age: 47
End: 2022-10-04

## 2022-12-13 ENCOUNTER — NON-APPOINTMENT (OUTPATIENT)
Age: 47
End: 2022-12-13

## 2022-12-15 ENCOUNTER — NON-APPOINTMENT (OUTPATIENT)
Age: 47
End: 2022-12-15

## 2023-01-17 ENCOUNTER — NON-APPOINTMENT (OUTPATIENT)
Age: 48
End: 2023-01-17

## 2023-02-02 ENCOUNTER — NON-APPOINTMENT (OUTPATIENT)
Age: 48
End: 2023-02-02

## 2023-04-12 NOTE — PHYSICAL EXAM
[FreeTextEntry1] : She is awake and alert - fluent with normal reception.\par Mild 1-2 beats end gaze nystagmus on bilateral horizontal gaze. None when looking up or down. No reported double vision.\par Face symmetric\par No drift\par UE strong - FFM\par LE strong\par Axial instability - difficulty walking independently.
Statement Selected

## 2023-07-13 ENCOUNTER — NON-APPOINTMENT (OUTPATIENT)
Age: 48
End: 2023-07-13

## 2023-07-27 ENCOUNTER — APPOINTMENT (OUTPATIENT)
Dept: NEUROLOGY | Facility: CLINIC | Age: 48
End: 2023-07-27
Payer: MEDICAID

## 2023-07-27 VITALS
SYSTOLIC BLOOD PRESSURE: 139 MMHG | HEIGHT: 63 IN | WEIGHT: 161.71 LBS | OXYGEN SATURATION: 95 % | HEART RATE: 93 BPM | BODY MASS INDEX: 28.65 KG/M2 | DIASTOLIC BLOOD PRESSURE: 86 MMHG | TEMPERATURE: 97.9 F

## 2023-07-27 PROCEDURE — 99215 OFFICE O/P EST HI 40 MIN: CPT

## 2023-07-27 NOTE — HISTORY OF PRESENT ILLNESS
[FreeTextEntry1] : NEURO-ONCOLOGY\par \par This 48 year old woman is seen in follow up for evaluation and management of brain stem tumor\par \par She presented in 3/23 with unsteady gait, diplopia, voice changes and dysphagia. 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  She did have some symptomatic relief but declined any follow up.  One main complaint was LBP, but spine MRI was unrevealing. \par \par She returns after prolonged absence.  She was seen at [facility] and had an MRI which showed improved tumor, by her report, done about 3 mo ago.  She does not recall whom she saw.  She returns with complaint of persistent and mildly worsened gait ataxia, using walking.  No headaches.  Still has LBP, radiating to left leg.  No diplopia or voice change.  \par \par PMH:  DM.  Fibroids\par \par PSH:  fibroid removal.  \par \par SHX:  unemployed, worked as an NA.  Here with [place], . nonsmoker. nondrinker\par \par FHX: no brain tumors

## 2023-07-27 NOTE — PHYSICAL EXAM
[FreeTextEntry1] : \par Exam as below (with documented exceptions in parentheses):\par \par General:  Healthy appearing woman well groomed and without deformities. KPS is 70\par \par Mental Status:  Awake, alert and attentive. Oriented to person, place and time. Recent and remote memory intact. Normal concentration. Fluent spontaneous speech with intact naming and repetition. Normal fund of knowledge.  \par \par Cranial Nerves: II: Full visual fields. III,IV,VI:Pupils round, reactive to light. Full extraocular movements. No nystagmus. V: Normal bilateral bite, facial sensation. VII: No facial weakness. VIII: Hearing intact. IX,X: Palate midline, intact gag. XI:  Sternocleidomastoids normal. XII: Tongue protrudes midline. No dysarthria.  (nystagmus on left gaze)\par \par Motor:  Normal tone, bulk and power throughout including arms and legs, proximal and distal. No pronator drift. No abnormal movements. \par \par Sensation: Normal in arms, legs and trunk to pin, proprioception and vibration. Negative Romberg. \par \par Coordination: No dysmetria or dysdiadochokinesis bilaterally. Normal heel-shin testing.  (ataxic on right FNF)\par \par Gait: Normal including heel and toe walking. Normal station.  (ataxic, wide based, better with walker)\par \par Reflexes: Normoactive and symmetric throughout. Absent Babinski bilaterally. \par \par Vascular: No peripheral edema/calf tenderness. \par \par Eyes: Funduscopic exam without papilledema\par \par Heart: Regular rate and rhythm. Normal s1-s2. No murmurs, rubs or gallops. \par \par Respiratory: Lungs clear to auscultation bilaterally. \par \par Abdomen: Nontender, non-distended. No hepatosplenomegaly. Normal bowel sounds in four quadrants. \par \par

## 2023-07-27 NOTE — DATA REVIEWED
[de-identified] : I reviewed serial MRI brain from 3/23 and 8/23 showing no change to nonenhancing brain stem tumor

## 2023-07-27 NOTE — DISCUSSION/SUMMARY
[FreeTextEntry1] : Brain stem tumor, s/p RT.  No biopsy.  ? worsening of symptoms recently.  \par \par Will obtain MRI brain done earlier this year for review\par New MRI given worsened symptoms - brain MRI with contrast coordinated\par RTC 2w

## 2023-08-08 ENCOUNTER — APPOINTMENT (OUTPATIENT)
Dept: MRI IMAGING | Facility: CLINIC | Age: 48
End: 2023-08-08

## 2023-08-17 ENCOUNTER — APPOINTMENT (OUTPATIENT)
Dept: NEUROLOGY | Facility: CLINIC | Age: 48
End: 2023-08-17

## 2023-08-21 ENCOUNTER — NON-APPOINTMENT (OUTPATIENT)
Age: 48
End: 2023-08-21

## 2023-08-24 ENCOUNTER — NON-APPOINTMENT (OUTPATIENT)
Age: 48
End: 2023-08-24

## 2023-08-24 ENCOUNTER — APPOINTMENT (OUTPATIENT)
Dept: MRI IMAGING | Facility: CLINIC | Age: 48
End: 2023-08-24

## 2023-09-07 ENCOUNTER — NON-APPOINTMENT (OUTPATIENT)
Age: 48
End: 2023-09-07

## 2023-09-19 ENCOUNTER — NON-APPOINTMENT (OUTPATIENT)
Age: 48
End: 2023-09-19

## 2023-10-03 ENCOUNTER — OUTPATIENT (OUTPATIENT)
Dept: OUTPATIENT SERVICES | Facility: HOSPITAL | Age: 48
LOS: 1 days | End: 2023-10-03

## 2023-10-03 ENCOUNTER — APPOINTMENT (OUTPATIENT)
Dept: MRI IMAGING | Facility: CLINIC | Age: 48
End: 2023-10-03
Payer: MEDICAID

## 2023-10-03 ENCOUNTER — NON-APPOINTMENT (OUTPATIENT)
Age: 48
End: 2023-10-03

## 2023-10-03 DIAGNOSIS — C71.7 MALIGNANT NEOPLASM OF BRAIN STEM: ICD-10-CM

## 2023-10-03 PROCEDURE — 70553 MRI BRAIN STEM W/O & W/DYE: CPT | Mod: 26

## 2023-10-05 ENCOUNTER — NON-APPOINTMENT (OUTPATIENT)
Age: 48
End: 2023-10-05

## 2023-10-05 ENCOUNTER — APPOINTMENT (OUTPATIENT)
Dept: NEUROLOGY | Facility: CLINIC | Age: 48
End: 2023-10-05
Payer: MEDICAID

## 2023-10-05 VITALS
HEART RATE: 93 BPM | TEMPERATURE: 97.3 F | WEIGHT: 165.56 LBS | OXYGEN SATURATION: 95 % | HEIGHT: 63 IN | SYSTOLIC BLOOD PRESSURE: 156 MMHG | BODY MASS INDEX: 29.34 KG/M2 | DIASTOLIC BLOOD PRESSURE: 97 MMHG

## 2023-10-05 DIAGNOSIS — M54.16 RADICULOPATHY, LUMBAR REGION: ICD-10-CM

## 2023-10-05 PROCEDURE — 99214 OFFICE O/P EST MOD 30 MIN: CPT

## 2023-10-06 ENCOUNTER — NON-APPOINTMENT (OUTPATIENT)
Age: 48
End: 2023-10-06

## 2023-10-31 ENCOUNTER — NON-APPOINTMENT (OUTPATIENT)
Age: 48
End: 2023-10-31

## 2024-01-22 ENCOUNTER — NON-APPOINTMENT (OUTPATIENT)
Age: 49
End: 2024-01-22

## 2024-01-22 ENCOUNTER — APPOINTMENT (OUTPATIENT)
Dept: MATERNAL FETAL MEDICINE | Facility: CLINIC | Age: 49
End: 2024-01-22

## 2024-04-01 ENCOUNTER — APPOINTMENT (OUTPATIENT)
Dept: MRI IMAGING | Facility: CLINIC | Age: 49
End: 2024-04-01
Payer: MEDICAID

## 2024-04-01 ENCOUNTER — OUTPATIENT (OUTPATIENT)
Dept: OUTPATIENT SERVICES | Facility: HOSPITAL | Age: 49
LOS: 1 days | End: 2024-04-01

## 2024-04-01 DIAGNOSIS — C71.7 MALIGNANT NEOPLASM OF BRAIN STEM: ICD-10-CM

## 2024-04-01 PROCEDURE — 70553 MRI BRAIN STEM W/O & W/DYE: CPT | Mod: 26

## 2024-04-04 ENCOUNTER — APPOINTMENT (OUTPATIENT)
Dept: NEUROLOGY | Facility: CLINIC | Age: 49
End: 2024-04-04
Payer: MEDICAID

## 2024-04-04 VITALS
DIASTOLIC BLOOD PRESSURE: 79 MMHG | SYSTOLIC BLOOD PRESSURE: 170 MMHG | RESPIRATION RATE: 16 BRPM | HEART RATE: 95 BPM | WEIGHT: 169 LBS | HEIGHT: 63 IN | OXYGEN SATURATION: 97 % | TEMPERATURE: 98.3 F | BODY MASS INDEX: 29.95 KG/M2

## 2024-04-04 DIAGNOSIS — C71.7 MALIGNANT NEOPLASM OF BRAIN STEM: ICD-10-CM

## 2024-04-04 DIAGNOSIS — R26.0 ATAXIC GAIT: ICD-10-CM

## 2024-04-04 PROCEDURE — 99214 OFFICE O/P EST MOD 30 MIN: CPT

## 2024-04-04 RX ORDER — METFORMIN HYDROCHLORIDE 1000 MG/1
1000 TABLET, COATED ORAL DAILY
Qty: 30 | Refills: 0 | Status: ACTIVE | COMMUNITY
Start: 2024-04-04

## 2024-04-04 RX ORDER — DEXAMETHASONE 4 MG/1
4 TABLET ORAL
Qty: 30 | Refills: 1 | Status: DISCONTINUED | COMMUNITY
Start: 2022-05-12 | End: 2024-04-04

## 2024-04-04 RX ORDER — GABAPENTIN 300 MG/1
300 CAPSULE ORAL AT BEDTIME
Qty: 60 | Refills: 0 | Status: DISCONTINUED | COMMUNITY
Start: 2022-06-23 | End: 2024-04-04

## 2024-04-04 NOTE — DISCUSSION/SUMMARY
[FreeTextEntry1] : Brain stem tumor, s/p RT.  No biopsy.  Stable to improved.  Lumbar radiculopathy, improved with PT.   New MRI coordinated for 6mo to follow tumor RTC 6mo

## 2024-04-04 NOTE — PHYSICAL EXAM
[FreeTextEntry1] : Exam as below (with documented exceptions in parentheses):  General:  Healthy appearing woman well groomed and without deformities. KPS is 70  Mental Status:  Awake, alert and attentive. Oriented to person, place and time. Recent and remote memory intact. Normal concentration. Fluent spontaneous speech with intact naming and repetition. Normal fund of knowledge.    Cranial Nerves: II: Full visual fields. III,IV,VI:Pupils round, reactive to light. Full extraocular movements. No nystagmus. V: Normal bilateral bite, facial sensation. VII: No facial weakness. VIII: Hearing intact. IX,X: Palate midline, intact gag. XI:  Sternocleidomastoids normal. XII: Tongue protrudes midline. No dysarthria.  (nystagmus on left gaze, jerky pursuits)  Motor:  Normal tone, bulk and power throughout including arms and legs, proximal and distal. No pronator drift. No abnormal movements.   Sensation: Normal in arms, legs and trunk to pin, proprioception and vibration. Negative Romberg.   Coordination: No dysmetria or dysdiadochokinesis bilaterally. Normal heel-shin testing.  (ataxic on right FNF)  Gait: Normal including heel and toe walking. Normal station.  (ataxic, wide based, better with walker)  Reflexes: Normoactive and symmetric throughout. Absent Babinski bilaterally.   Vascular: No peripheral edema/calf tenderness.   Eyes: Funduscopic exam without papilledema (deferred)  Heart: Regular rate and rhythm. Normal s1-s2. No murmurs, rubs or gallops.   Respiratory: Lungs clear to auscultation bilaterally.   Abdomen: Nontender, non-distended. No hepatosplenomegaly. Normal bowel sounds in four quadrants.

## 2024-04-04 NOTE — DATA REVIEWED
[de-identified] : I reviewed serial MRI brain from 3/22 and 8/22 showing no change to nonenhancing brain stem tumor, serially improved in 5/23 and 10/23 without new enhancement, stable in 4/24

## 2024-04-04 NOTE — HISTORY OF PRESENT ILLNESS
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  She did have some symptomatic relief.  One main complaint was LBP, but spine MRI was unrevealing.   INTERVAL HISTORY:  MRI brain shows no new enhancement or progressive brain stem FLAIR, Overall feels well, walking a bit better.  PMH:  DM.  Fibroids PSH:  fibroid removal.   SHX:  unemployed, worked as an NA.  Here without Vj, . nonsmoker. nondrinker FHX: no brain tumors

## 2024-04-05 ENCOUNTER — NON-APPOINTMENT (OUTPATIENT)
Age: 49
End: 2024-04-05

## 2024-10-07 ENCOUNTER — APPOINTMENT (OUTPATIENT)
Dept: MRI IMAGING | Facility: CLINIC | Age: 49
End: 2024-10-07
Payer: MEDICAID

## 2024-10-07 ENCOUNTER — OUTPATIENT (OUTPATIENT)
Dept: OUTPATIENT SERVICES | Facility: HOSPITAL | Age: 49
LOS: 1 days | End: 2024-10-07

## 2024-10-07 PROCEDURE — 70553 MRI BRAIN STEM W/O & W/DYE: CPT | Mod: 26

## 2024-10-10 ENCOUNTER — APPOINTMENT (OUTPATIENT)
Dept: NEUROLOGY | Facility: CLINIC | Age: 49
End: 2024-10-10

## 2024-10-10 VITALS
HEIGHT: 63 IN | DIASTOLIC BLOOD PRESSURE: 82 MMHG | BODY MASS INDEX: 28.36 KG/M2 | HEART RATE: 107 BPM | SYSTOLIC BLOOD PRESSURE: 144 MMHG | OXYGEN SATURATION: 95 % | WEIGHT: 160.06 LBS

## 2024-11-07 ENCOUNTER — NON-APPOINTMENT (OUTPATIENT)
Age: 49
End: 2024-11-07

## 2024-11-07 ENCOUNTER — APPOINTMENT (OUTPATIENT)
Dept: NEUROLOGY | Facility: CLINIC | Age: 49
End: 2024-11-07
Payer: MEDICAID

## 2024-11-07 VITALS
SYSTOLIC BLOOD PRESSURE: 162 MMHG | HEIGHT: 63 IN | BODY MASS INDEX: 28.03 KG/M2 | OXYGEN SATURATION: 98 % | DIASTOLIC BLOOD PRESSURE: 88 MMHG | TEMPERATURE: 97.6 F | HEART RATE: 109 BPM | WEIGHT: 158.19 LBS

## 2024-11-07 DIAGNOSIS — C71.7 MALIGNANT NEOPLASM OF BRAIN STEM: ICD-10-CM

## 2024-11-07 DIAGNOSIS — R26.0 ATAXIC GAIT: ICD-10-CM

## 2024-11-07 PROCEDURE — 99214 OFFICE O/P EST MOD 30 MIN: CPT

## 2025-03-26 ENCOUNTER — OUTPATIENT (OUTPATIENT)
Dept: OUTPATIENT SERVICES | Facility: HOSPITAL | Age: 50
LOS: 1 days | End: 2025-03-26

## 2025-03-26 ENCOUNTER — APPOINTMENT (OUTPATIENT)
Dept: MRI IMAGING | Facility: CLINIC | Age: 50
End: 2025-03-26
Payer: MEDICAID

## 2025-03-26 DIAGNOSIS — Z00.8 ENCOUNTER FOR OTHER GENERAL EXAMINATION: ICD-10-CM

## 2025-03-26 PROCEDURE — 70553 MRI BRAIN STEM W/O & W/DYE: CPT | Mod: 26

## 2025-04-03 ENCOUNTER — APPOINTMENT (OUTPATIENT)
Dept: NEUROLOGY | Facility: CLINIC | Age: 50
End: 2025-04-03
Payer: MEDICAID

## 2025-04-03 VITALS
DIASTOLIC BLOOD PRESSURE: 94 MMHG | BODY MASS INDEX: 28.18 KG/M2 | SYSTOLIC BLOOD PRESSURE: 170 MMHG | WEIGHT: 159.01 LBS | HEIGHT: 63 IN | OXYGEN SATURATION: 95 % | HEART RATE: 78 BPM

## 2025-04-03 DIAGNOSIS — M54.16 RADICULOPATHY, LUMBAR REGION: ICD-10-CM

## 2025-04-03 DIAGNOSIS — C71.7 MALIGNANT NEOPLASM OF BRAIN STEM: ICD-10-CM

## 2025-04-03 DIAGNOSIS — R26.0 ATAXIC GAIT: ICD-10-CM

## 2025-04-03 PROCEDURE — 99214 OFFICE O/P EST MOD 30 MIN: CPT
